# Patient Record
Sex: MALE | Race: WHITE | NOT HISPANIC OR LATINO | Employment: FULL TIME | URBAN - METROPOLITAN AREA
[De-identification: names, ages, dates, MRNs, and addresses within clinical notes are randomized per-mention and may not be internally consistent; named-entity substitution may affect disease eponyms.]

---

## 2023-12-13 ENCOUNTER — APPOINTMENT (OUTPATIENT)
Dept: GENERAL RADIOLOGY | Facility: HOSPITAL | Age: 63
End: 2023-12-13
Payer: COMMERCIAL

## 2023-12-13 ENCOUNTER — HOSPITAL ENCOUNTER (EMERGENCY)
Facility: HOSPITAL | Age: 63
Discharge: HOME | End: 2023-12-14
Attending: EMERGENCY MEDICINE
Payer: COMMERCIAL

## 2023-12-13 DIAGNOSIS — R07.89 CHEST WALL PAIN: Primary | ICD-10-CM

## 2023-12-13 DIAGNOSIS — I10 HYPERTENSION, UNSPECIFIED TYPE: ICD-10-CM

## 2023-12-13 DIAGNOSIS — Z86.79 HISTORY OF ATRIAL FIBRILLATION: ICD-10-CM

## 2023-12-13 DIAGNOSIS — Z79.01 ANTICOAGULATED: ICD-10-CM

## 2023-12-13 LAB
ALBUMIN SERPL-MCNC: 4.3 G/DL (ref 3.5–5.2)
ALBUMIN/GLOB SERPL: 1.5 G/DL
ALP SERPL-CCNC: 78 U/L (ref 39–117)
ALT SERPL W P-5'-P-CCNC: 19 U/L (ref 1–41)
ANION GAP SERPL CALCULATED.3IONS-SCNC: 11.2 MMOL/L (ref 5–15)
AST SERPL-CCNC: 20 U/L (ref 1–40)
BASOPHILS # BLD AUTO: 0.08 10*3/MM3 (ref 0–0.2)
BASOPHILS NFR BLD AUTO: 0.9 % (ref 0–1.5)
BILIRUB SERPL-MCNC: 0.2 MG/DL (ref 0–1.2)
BUN SERPL-MCNC: 18 MG/DL (ref 8–23)
BUN/CREAT SERPL: 12.9 (ref 7–25)
CALCIUM SPEC-SCNC: 9.6 MG/DL (ref 8.6–10.5)
CHLORIDE SERPL-SCNC: 106 MMOL/L (ref 98–107)
CO2 SERPL-SCNC: 26.8 MMOL/L (ref 22–29)
CREAT SERPL-MCNC: 1.4 MG/DL (ref 0.76–1.27)
D DIMER PPP FEU-MCNC: 0.34 MCGFEU/ML (ref 0–0.61)
DEPRECATED RDW RBC AUTO: 44.2 FL (ref 37–54)
EGFRCR SERPLBLD CKD-EPI 2021: 57.2 ML/MIN/1.73
EOSINOPHIL # BLD AUTO: 0.31 10*3/MM3 (ref 0–0.4)
EOSINOPHIL NFR BLD AUTO: 3.5 % (ref 0.3–6.2)
ERYTHROCYTE [DISTWIDTH] IN BLOOD BY AUTOMATED COUNT: 13.7 % (ref 12.3–15.4)
GLOBULIN UR ELPH-MCNC: 2.9 GM/DL
GLUCOSE SERPL-MCNC: 137 MG/DL (ref 65–99)
HCT VFR BLD AUTO: 46.2 % (ref 37.5–51)
HGB BLD-MCNC: 15.3 G/DL (ref 13–17.7)
HOLD SPECIMEN: NORMAL
HOLD SPECIMEN: NORMAL
IMM GRANULOCYTES # BLD AUTO: 0.03 10*3/MM3 (ref 0–0.05)
IMM GRANULOCYTES NFR BLD AUTO: 0.3 % (ref 0–0.5)
LYMPHOCYTES # BLD AUTO: 1.91 10*3/MM3 (ref 0.7–3.1)
LYMPHOCYTES NFR BLD AUTO: 21.6 % (ref 19.6–45.3)
MCH RBC QN AUTO: 29.2 PG (ref 26.6–33)
MCHC RBC AUTO-ENTMCNC: 33.1 G/DL (ref 31.5–35.7)
MCV RBC AUTO: 88.2 FL (ref 79–97)
MONOCYTES # BLD AUTO: 0.92 10*3/MM3 (ref 0.1–0.9)
MONOCYTES NFR BLD AUTO: 10.4 % (ref 5–12)
NEUTROPHILS NFR BLD AUTO: 5.61 10*3/MM3 (ref 1.7–7)
NEUTROPHILS NFR BLD AUTO: 63.3 % (ref 42.7–76)
NRBC BLD AUTO-RTO: 0 /100 WBC (ref 0–0.2)
PLATELET # BLD AUTO: 205 10*3/MM3 (ref 140–450)
PMV BLD AUTO: 9.1 FL (ref 6–12)
POTASSIUM SERPL-SCNC: 4 MMOL/L (ref 3.5–5.2)
PROT SERPL-MCNC: 7.2 G/DL (ref 6–8.5)
RBC # BLD AUTO: 5.24 10*6/MM3 (ref 4.14–5.8)
SODIUM SERPL-SCNC: 144 MMOL/L (ref 136–145)
TROPONIN T SERPL HS-MCNC: 11 NG/L
WBC NRBC COR # BLD AUTO: 8.86 10*3/MM3 (ref 3.4–10.8)
WHOLE BLOOD HOLD COAG: NORMAL
WHOLE BLOOD HOLD SPECIMEN: NORMAL

## 2023-12-13 PROCEDURE — 99284 EMERGENCY DEPT VISIT MOD MDM: CPT

## 2023-12-13 PROCEDURE — 84484 ASSAY OF TROPONIN QUANT: CPT

## 2023-12-13 PROCEDURE — 85379 FIBRIN DEGRADATION QUANT: CPT | Performed by: PHYSICIAN ASSISTANT

## 2023-12-13 PROCEDURE — 84484 ASSAY OF TROPONIN QUANT: CPT | Performed by: EMERGENCY MEDICINE

## 2023-12-13 PROCEDURE — 80053 COMPREHEN METABOLIC PANEL: CPT

## 2023-12-13 PROCEDURE — 93005 ELECTROCARDIOGRAM TRACING: CPT | Performed by: EMERGENCY MEDICINE

## 2023-12-13 PROCEDURE — 36415 COLL VENOUS BLD VENIPUNCTURE: CPT

## 2023-12-13 PROCEDURE — 85025 COMPLETE CBC W/AUTO DIFF WBC: CPT

## 2023-12-13 PROCEDURE — 71045 X-RAY EXAM CHEST 1 VIEW: CPT

## 2023-12-13 PROCEDURE — 93005 ELECTROCARDIOGRAM TRACING: CPT

## 2023-12-13 RX ORDER — ASPIRIN 325 MG
325 TABLET ORAL ONCE
Status: DISCONTINUED | OUTPATIENT
Start: 2023-12-13 | End: 2023-12-13

## 2023-12-13 RX ORDER — SODIUM CHLORIDE 0.9 % (FLUSH) 0.9 %
10 SYRINGE (ML) INJECTION AS NEEDED
Status: DISCONTINUED | OUTPATIENT
Start: 2023-12-13 | End: 2023-12-14 | Stop reason: HOSPADM

## 2023-12-14 VITALS
SYSTOLIC BLOOD PRESSURE: 128 MMHG | BODY MASS INDEX: 39.37 KG/M2 | HEIGHT: 70 IN | RESPIRATION RATE: 16 BRPM | DIASTOLIC BLOOD PRESSURE: 78 MMHG | OXYGEN SATURATION: 95 % | TEMPERATURE: 96.6 F | HEART RATE: 68 BPM | WEIGHT: 275 LBS

## 2023-12-14 LAB
GEN 5 2HR TROPONIN T REFLEX: 15 NG/L
QT INTERVAL: 391 MS
QTC INTERVAL: 419 MS
TROPONIN T DELTA: 4 NG/L

## 2023-12-14 RX ORDER — METHOCARBAMOL 500 MG/1
1000 TABLET, FILM COATED ORAL 4 TIMES DAILY
Qty: 40 TABLET | Refills: 0 | Status: SHIPPED | OUTPATIENT
Start: 2023-12-14

## 2023-12-14 NOTE — ED PROVIDER NOTES
EMERGENCY DEPARTMENT ENCOUNTER    Room Number:  08/08  Date of encounter:  12/14/2023  PCP: Aleja Valdez MD  Patient Care Team:  Aleja Valdez MD as PCP - General (Nurse Practitioner)   Independent Historians: Patient    HPI:  Chief Complaint: Chest pain  A complete HPI/ROS/PMH/PSH/SH/FH are unobtainable due to: N/A    Chronic or social conditions impacting patient care (social determinants of health): None    Context: Kin Martin is a 63 y.o. male with past medical history of PAF on Eliquis, HTN, CKD, and obesity who arrives to the ED with complaint of right-sided rib pain and shortness of breath.  Patient states that he started having sharp and shooting episodic right-sided rib pain for the past 2 to 3 days that worsened today with generalized chest tightness.  Patient states his symptoms are worse with movement and deep breath, better when holding still.  Denies any shortness of breath, fever, chills, or productive cough, or hemoptysis.    Review of prior external notes (non-ED): Last office visit with nephrology on 11/29/2023 for stage IIIa chronic kidney disease    Review of prior external test results outside of this encounter: Most recent creatinine was 1.33 on 2/16/2023, most recent chest imaging was a 1 view chest on 9/24/2022 that showed mild cardiomegaly only.    PAST MEDICAL HISTORY  Active Ambulatory Problems     Diagnosis Date Noted    No Active Ambulatory Problems     Resolved Ambulatory Problems     Diagnosis Date Noted    No Resolved Ambulatory Problems     No Additional Past Medical History       The patient has started, but not completed, their COVID-19 vaccination series.    PAST SURGICAL HISTORY  No past surgical history on file.      FAMILY HISTORY  No family history on file.      SOCIAL HISTORY  Social History     Socioeconomic History    Marital status:          ALLERGIES  Atorvastatin        REVIEW OF SYSTEMS  Review of Systems     All systems reviewed and  negative except for those discussed in HPI.       PHYSICAL EXAM    I have reviewed the triage vital signs and nursing notes.    ED Triage Vitals   Temp Heart Rate Resp BP SpO2   12/13/23 2111 12/13/23 2108 12/13/23 2108 12/13/23 2113 12/13/23 2108   96.6 °F (35.9 °C) 80 16 152/80 97 %      Temp src Heart Rate Source Patient Position BP Location FiO2 (%)   12/13/23 2111 -- 12/13/23 2113 12/13/23 2113 --   Tympanic  Lying Left arm        Physical Exam    GENERAL: alert and oriented x 4, anxious, obese, but not distressed  HENT: normocephalic, atraumatic, moist mucous membranes  EYES: no scleral icterus, PERRL, EOMI  CV: regular rhythm, regular rate, no murmurs, rubs, or gallops  RESPIRATORY: normal effort, CTAB, tender to palpate right lower lateral chest wall, no crepitus or subcutaneous emphysema  ABDOMEN: soft/nontender, no rebound or guarding, no hepatosplenomegaly  MUSCULOSKELETAL: no deformity  NEURO: alert, moves all extremities, no focal neuro deficits, follows commands  SKIN: warm, dry, no dermatomal rash   Psych: Appropriate mood and affect      Nursing notes and vital signs reviewed      LAB RESULTS  Recent Results (from the past 24 hour(s))   ECG 12 Lead ED Triage Standing Order; Chest Pain    Collection Time: 12/13/23  9:11 PM   Result Value Ref Range    QT Interval 391 ms    QTC Interval 419 ms   Comprehensive Metabolic Panel    Collection Time: 12/13/23  9:25 PM    Specimen: Blood   Result Value Ref Range    Glucose 137 (H) 65 - 99 mg/dL    BUN 18 8 - 23 mg/dL    Creatinine 1.40 (H) 0.76 - 1.27 mg/dL    Sodium 144 136 - 145 mmol/L    Potassium 4.0 3.5 - 5.2 mmol/L    Chloride 106 98 - 107 mmol/L    CO2 26.8 22.0 - 29.0 mmol/L    Calcium 9.6 8.6 - 10.5 mg/dL    Total Protein 7.2 6.0 - 8.5 g/dL    Albumin 4.3 3.5 - 5.2 g/dL    ALT (SGPT) 19 1 - 41 U/L    AST (SGOT) 20 1 - 40 U/L    Alkaline Phosphatase 78 39 - 117 U/L    Total Bilirubin 0.2 0.0 - 1.2 mg/dL    Globulin 2.9 gm/dL    A/G Ratio 1.5 g/dL     BUN/Creatinine Ratio 12.9 7.0 - 25.0    Anion Gap 11.2 5.0 - 15.0 mmol/L    eGFR 57.2 (L) >60.0 mL/min/1.73   High Sensitivity Troponin T    Collection Time: 12/13/23  9:25 PM    Specimen: Blood   Result Value Ref Range    HS Troponin T 11 <22 ng/L   Green Top (Gel)    Collection Time: 12/13/23  9:25 PM   Result Value Ref Range    Extra Tube Hold for add-ons.    Lavender Top    Collection Time: 12/13/23  9:25 PM   Result Value Ref Range    Extra Tube hold for add-on    Gold Top - SST    Collection Time: 12/13/23  9:25 PM   Result Value Ref Range    Extra Tube Hold for add-ons.    Light Blue Top    Collection Time: 12/13/23  9:25 PM   Result Value Ref Range    Extra Tube Hold for add-ons.    CBC Auto Differential    Collection Time: 12/13/23  9:25 PM    Specimen: Blood   Result Value Ref Range    WBC 8.86 3.40 - 10.80 10*3/mm3    RBC 5.24 4.14 - 5.80 10*6/mm3    Hemoglobin 15.3 13.0 - 17.7 g/dL    Hematocrit 46.2 37.5 - 51.0 %    MCV 88.2 79.0 - 97.0 fL    MCH 29.2 26.6 - 33.0 pg    MCHC 33.1 31.5 - 35.7 g/dL    RDW 13.7 12.3 - 15.4 %    RDW-SD 44.2 37.0 - 54.0 fl    MPV 9.1 6.0 - 12.0 fL    Platelets 205 140 - 450 10*3/mm3    Neutrophil % 63.3 42.7 - 76.0 %    Lymphocyte % 21.6 19.6 - 45.3 %    Monocyte % 10.4 5.0 - 12.0 %    Eosinophil % 3.5 0.3 - 6.2 %    Basophil % 0.9 0.0 - 1.5 %    Immature Grans % 0.3 0.0 - 0.5 %    Neutrophils, Absolute 5.61 1.70 - 7.00 10*3/mm3    Lymphocytes, Absolute 1.91 0.70 - 3.10 10*3/mm3    Monocytes, Absolute 0.92 (H) 0.10 - 0.90 10*3/mm3    Eosinophils, Absolute 0.31 0.00 - 0.40 10*3/mm3    Basophils, Absolute 0.08 0.00 - 0.20 10*3/mm3    Immature Grans, Absolute 0.03 0.00 - 0.05 10*3/mm3    nRBC 0.0 0.0 - 0.2 /100 WBC   D-dimer, Quantitative    Collection Time: 12/13/23  9:25 PM    Specimen: Blood   Result Value Ref Range    D-Dimer, Quantitative 0.34 0.00 - 0.61 MCGFEU/mL   High Sensitivity Troponin T 2Hr    Collection Time: 12/13/23 11:37 PM    Specimen: Blood   Result Value Ref  Range    HS Troponin T 15 <22 ng/L    Troponin T Delta 4 (C) >=-4 - <+4 ng/L       Ordered the above labs and independently reviewed and interpreted the results by me.        RADIOLOGY  XR Chest 1 View    Result Date: 12/13/2023  SINGLE VIEW OF THE CHEST  HISTORY: Right-sided rib pain  COMPARISON: None available.  FINDINGS: There is cardiomegaly. There is no vascular congestion. No pneumothorax or pleural effusion seen. No displaced rib fractures are identified.      No acute findings.  This report was finalized on 12/13/2023 9:57 PM by Dr. Anahi Garza M.D on Workstation: [id number]       I ordered the above noted radiological studies.  These were independently interpreted and reviewed by me.  See dictation for official radiology interpretation.      PROCEDURES    Procedures      MEDICATIONS GIVEN IN ER    Medications   sodium chloride 0.9 % flush 10 mL (has no administration in time range)         PROGRESS, DATA ANALYSIS, CONSULTS, AND MEDICAL DECISION MAKING    All labs have been independently reviewed by me.  All radiology studies have been reviewed by me and discussed with radiologist dictating the report.   EKG's independently viewed and interpreted by me.  Discussion below represents my analysis of pertinent findings related to patient's condition, differential diagnosis, treatment plan and final disposition.    DDx:  Includes, but is not limited to shingles, PE, musculoskeletal pain, pleurisy, rib pain, pneumothorax, pneumonia    After my initial assessment I am concerned about PE and patient may need hospitalization due to anticoagulation.    ED Course as of 12/14/23 0027   Wed Dec 13, 2023   2152 EKG          EKG time: 2111  Rhythm/Rate: Sinus rhythm at 69 bpm  P waves and PA: Normal  QRS, axis: Normal QT interval, borderline LAD  ST and T waves: No acute ST/T wave changes    Interpreted Contemporaneously by me, independently viewed  No prior EKG for comparison.   [LITTLE]   8343 3 images independently  viewed by me, my interpretation is no pneumothorax, no displaced rib fracture, no pleural effusion, no acute infiltrates. [LITTLE]   2153 WBC: 8.86 [LITTLE]   2154 Hemoglobin: 15.3 [LITTLE]   2154 Hematocrit: 46.2 [LITTLE]   2154 Platelets: 205 [LITTLE]   2201 Glucose(!): 137 [LITTLE]   2201 BUN: 18 [LITTLE]   2201 Creatinine(!): 1.40 [LITTLE]   2201 Sodium: 144 [LITTLE]   2201 Potassium: 4.0 [LITTLE]   2201 Chloride: 106 [LITTLE]   2201 CO2: 26.8 [LITTLE]   2201 HS Troponin T: 11 [LITTLE]   2202 Creatinine(!): 1.40  Patient's creatinine is close to baseline. [LITTLE]   2212 D-Dimer, Quant: 0.34 [LITTLE]   Thu Dec 14, 2023   0020 HS Troponin T: 15 [LITTLE]   0023 Patient rechecked, resting comfortably bed, no further complaints.  Discussed results, diagnosis, treatment plan.  Patient expresses understanding and agrees with plan. [LITTLE]      ED Course User Index  [LITTLE] Edi Gimenez PA       MDM: Patient's evaluation is unremarkable including a nonischemic EKG, negative troponin x 2, and a normal chest x-ray.  Patient's D-dimer is negative and he is also anticoagulated on Eliquis and therefore have very low suspicion for PE, there is no evidence for ACS, or any other acute or life-threatening abnormality.  Patient has reproducible chest wall pain with tenderness to palpation and suspect it is musculoskeletal in origin.  Will treat with Tylenol and muscle relaxers and short-term PCP follow-up, after a shared decision-making discussion with the patient he is comfortable with plan for discharge home.    PPE:  The patient wore a mask and I wore a mask and all appropriate PPE throughout the entire patient encounter.      AS OF 00:27 EST VITALS:    BP - 118/70  HR - 70  TEMP - 96.6 °F (35.9 °C) (Tympanic)  O2 SATS - 95%      DIAGNOSIS  Final diagnoses:   Chest wall pain   Anticoagulated   History of atrial fibrillation   Hypertension, unspecified type         DISPOSITION  DISCHARGE    Patient discharged in stable condition.    Reviewed implications of results, diagnosis, meds,  responsibility to follow up, warning signs and symptoms of possible worsening, potential complications and reasons to return to ER.    Patient/Family voiced understanding of above instructions.    Discussed plan for discharge, as there is no emergent indication for admission. Patient referred to primary care provider for BP management due to today's BP. Pt/family is agreeable and understands need for follow up and repeat testing.  Pt is aware that discharge does not mean that nothing is wrong but it indicates no emergency is present that requires admission and they must continue care with follow-up as given below or physician of their choice.     FOLLOW-UP  Aleja Valdez MD  [facility] [place] [id number] [phone]    Schedule an appointment as soon as possible for a visit            Medication List        New Prescriptions      methocarbamol 500 MG tablet  Commonly known as: ROBAXIN  Take 2 tablets by mouth 4 (Four) Times a Day.               Where to Get Your Medications        These medications were sent to [facility] [address] [place] [address] - [address] [facility] - [phone]  - [phone] [address] [address]      Phone: [phone]   methocarbamol 500 MG tablet           Note Disclaimer: At [facility], we believe that sharing information builds trust and better relationships. You are receiving this note because you recently visited [facility]. It is possible you will see health information before a provider has talked with you about it. This kind of information can be easy to misunderstand. To help you fully understand what it means for your health, we urge you to discuss this note with your provider.       Edi Gimenez PA  12/14/23 0027

## 2023-12-14 NOTE — DISCHARGE INSTRUCTIONS
Home, rest, medicine as directed, Tylenol as needed for pain.  Home medicine as prescribed, follow up with PCP for recheck. Return to care with further concerns.

## 2023-12-14 NOTE — ED TRIAGE NOTES
Patient ambulatory with reports of R sided rib pain that shoots with movement x a few days. Patient denies injury. States his chest began feeling tight today. Patient states it feels difficult to take a deep breath. Patient reports hx of afib, is on eliquis.

## 2023-12-14 NOTE — ED PROVIDER NOTES
MD ATTESTATION NOTE    The CALLI and I have discussed this patient's history, physical exam, and treatment plan.  I have reviewed the documentation and personally had a face to face interaction with the patient. I affirm the documentation and agree with the treatment and plan.  The attached note describes my personal findings.      I provided a substantive portion of the care of the patient.  I personally performed the physical exam in its entirety, and below are my findings.      Brief HPI: 63-year-old male with history of paroxysmal A-fib on Eliquis who presents emergency room complaining of right-sided rib pain and shortness of breath.  He states this has been going on for the past several days.  He states it is worse when he turns to the right.    General : 63-year-old patient is awake alert and oriented  HEENT: NCAT  CV: Heart is regular with no murmurs  Chest: Reproducible right chest wall tenderness to palpation and with movement of his torso  Respiratory: CTA bilaterally  Abd: Soft and nontender  Ext: No acute abnormalities  Skin: No rash  Neuro: Awake with a nonfocal neuro exam  Psych: Normal mood and affect      Plan: Will check labs, EKG and chest x-ray for further evaluation    The patient's D-dimer and initial troponin are normal.  My independent rotation the patient's chest x-ray is no pneumonia or pneumothorax or rib fractures  Patient's EKG shows normal sinus rhythm and is nonischemic  We will check a second troponin but provided it is normal I will believe this patient has musculoskeletal chest wall pain and will be stable for discharge home.  I discussed the above with the patient and Edi Trejo.     Ry Stringer MD  12/13/23 0363